# Patient Record
Sex: FEMALE | Race: OTHER | HISPANIC OR LATINO | URBAN - METROPOLITAN AREA
[De-identification: names, ages, dates, MRNs, and addresses within clinical notes are randomized per-mention and may not be internally consistent; named-entity substitution may affect disease eponyms.]

---

## 2019-06-01 ENCOUNTER — INPATIENT (INPATIENT)
Facility: HOSPITAL | Age: 69
LOS: 0 days | Discharge: HOME | DRG: 310 | End: 2019-06-02
Attending: INTERNAL MEDICINE | Admitting: INTERNAL MEDICINE
Payer: COMMERCIAL

## 2019-06-01 VITALS
SYSTOLIC BLOOD PRESSURE: 139 MMHG | DIASTOLIC BLOOD PRESSURE: 97 MMHG | HEART RATE: 141 BPM | WEIGHT: 138.89 LBS | TEMPERATURE: 98 F | RESPIRATION RATE: 18 BRPM | OXYGEN SATURATION: 97 %

## 2019-06-01 DIAGNOSIS — I48.91 UNSPECIFIED ATRIAL FIBRILLATION: ICD-10-CM

## 2019-06-01 LAB
ALBUMIN SERPL ELPH-MCNC: 4.4 G/DL — SIGNIFICANT CHANGE UP (ref 3.3–5)
ALP SERPL-CCNC: 87 U/L — SIGNIFICANT CHANGE UP (ref 40–120)
ALT FLD-CCNC: 16 U/L — SIGNIFICANT CHANGE UP (ref 10–45)
ANION GAP SERPL CALC-SCNC: 11 MMOL/L — SIGNIFICANT CHANGE UP (ref 5–17)
APTT BLD: 30.2 SEC — SIGNIFICANT CHANGE UP (ref 27.5–36.3)
AST SERPL-CCNC: 20 U/L — SIGNIFICANT CHANGE UP (ref 10–40)
BASOPHILS # BLD AUTO: 0.04 K/UL — SIGNIFICANT CHANGE UP (ref 0–0.2)
BASOPHILS NFR BLD AUTO: 0.4 % — SIGNIFICANT CHANGE UP (ref 0–2)
BILIRUB SERPL-MCNC: <0.2 MG/DL — SIGNIFICANT CHANGE UP (ref 0.2–1.2)
BUN SERPL-MCNC: 21 MG/DL — SIGNIFICANT CHANGE UP (ref 7–23)
CALCIUM SERPL-MCNC: 10.1 MG/DL — SIGNIFICANT CHANGE UP (ref 8.4–10.5)
CHLORIDE SERPL-SCNC: 109 MMOL/L — HIGH (ref 96–108)
CHOLEST SERPL-MCNC: 280 MG/DL — HIGH (ref 10–199)
CK MB CFR SERPL CALC: 1.8 NG/ML — SIGNIFICANT CHANGE UP (ref 0–6.7)
CK MB CFR SERPL CALC: 2.4 NG/ML — SIGNIFICANT CHANGE UP (ref 0–6.7)
CK MB CFR SERPL CALC: 2.5 NG/ML — SIGNIFICANT CHANGE UP (ref 0–6.7)
CK SERPL-CCNC: 70 U/L — SIGNIFICANT CHANGE UP (ref 25–170)
CK SERPL-CCNC: 70 U/L — SIGNIFICANT CHANGE UP (ref 25–170)
CK SERPL-CCNC: 93 U/L — SIGNIFICANT CHANGE UP (ref 25–170)
CO2 SERPL-SCNC: 26 MMOL/L — SIGNIFICANT CHANGE UP (ref 22–31)
CREAT SERPL-MCNC: 0.76 MG/DL — SIGNIFICANT CHANGE UP (ref 0.5–1.3)
EOSINOPHIL # BLD AUTO: 0.11 K/UL — SIGNIFICANT CHANGE UP (ref 0–0.5)
EOSINOPHIL NFR BLD AUTO: 1.2 % — SIGNIFICANT CHANGE UP (ref 0–6)
GLUCOSE SERPL-MCNC: 111 MG/DL — HIGH (ref 70–99)
HBA1C BLD-MCNC: 5.3 % — SIGNIFICANT CHANGE UP (ref 4–5.6)
HCT VFR BLD CALC: 44.1 % — SIGNIFICANT CHANGE UP (ref 34.5–45)
HDLC SERPL-MCNC: 44 MG/DL — LOW
HGB BLD-MCNC: 14.1 G/DL — SIGNIFICANT CHANGE UP (ref 11.5–15.5)
IMM GRANULOCYTES NFR BLD AUTO: 0.4 % — SIGNIFICANT CHANGE UP (ref 0–1.5)
INR BLD: 0.99 — SIGNIFICANT CHANGE UP (ref 0.88–1.16)
LIPID PNL WITH DIRECT LDL SERPL: 198 MG/DL — HIGH
LYMPHOCYTES # BLD AUTO: 2.62 K/UL — SIGNIFICANT CHANGE UP (ref 1–3.3)
LYMPHOCYTES # BLD AUTO: 27.6 % — SIGNIFICANT CHANGE UP (ref 13–44)
MCHC RBC-ENTMCNC: 29.7 PG — SIGNIFICANT CHANGE UP (ref 27–34)
MCHC RBC-ENTMCNC: 32 GM/DL — SIGNIFICANT CHANGE UP (ref 32–36)
MCV RBC AUTO: 93 FL — SIGNIFICANT CHANGE UP (ref 80–100)
MONOCYTES # BLD AUTO: 0.72 K/UL — SIGNIFICANT CHANGE UP (ref 0–0.9)
MONOCYTES NFR BLD AUTO: 7.6 % — SIGNIFICANT CHANGE UP (ref 2–14)
NEUTROPHILS # BLD AUTO: 5.97 K/UL — SIGNIFICANT CHANGE UP (ref 1.8–7.4)
NEUTROPHILS NFR BLD AUTO: 62.8 % — SIGNIFICANT CHANGE UP (ref 43–77)
NRBC # BLD: 0 /100 WBCS — SIGNIFICANT CHANGE UP (ref 0–0)
PLATELET # BLD AUTO: 289 K/UL — SIGNIFICANT CHANGE UP (ref 150–400)
POTASSIUM SERPL-MCNC: 4.4 MMOL/L — SIGNIFICANT CHANGE UP (ref 3.5–5.3)
POTASSIUM SERPL-SCNC: 4.4 MMOL/L — SIGNIFICANT CHANGE UP (ref 3.5–5.3)
PROT SERPL-MCNC: 7.6 G/DL — SIGNIFICANT CHANGE UP (ref 6–8.3)
PROTHROM AB SERPL-ACNC: 11.2 SEC — SIGNIFICANT CHANGE UP (ref 10–12.9)
RBC # BLD: 4.74 M/UL — SIGNIFICANT CHANGE UP (ref 3.8–5.2)
RBC # FLD: 13 % — SIGNIFICANT CHANGE UP (ref 10.3–14.5)
SODIUM SERPL-SCNC: 146 MMOL/L — HIGH (ref 135–145)
T3FREE SERPL-MCNC: 2.86 PG/ML — SIGNIFICANT CHANGE UP (ref 1.8–4.6)
T4 FREE SERPL-MCNC: 0.95 NG/DL — SIGNIFICANT CHANGE UP (ref 0.7–1.48)
TOTAL CHOLESTEROL/HDL RATIO MEASUREMENT: 6.4 RATIO — SIGNIFICANT CHANGE UP (ref 3.3–7.1)
TRIGL SERPL-MCNC: 191 MG/DL — HIGH (ref 10–149)
TROPONIN T SERPL-MCNC: <0.01 NG/ML — SIGNIFICANT CHANGE UP (ref 0–0.01)
TSH SERPL-MCNC: 1.41 UIU/ML — SIGNIFICANT CHANGE UP (ref 0.35–4.94)
WBC # BLD: 9.5 K/UL — SIGNIFICANT CHANGE UP (ref 3.8–10.5)
WBC # FLD AUTO: 9.5 K/UL — SIGNIFICANT CHANGE UP (ref 3.8–10.5)

## 2019-06-01 PROCEDURE — 99285 EMERGENCY DEPT VISIT HI MDM: CPT | Mod: 25

## 2019-06-01 PROCEDURE — 93306 TTE W/DOPPLER COMPLETE: CPT | Mod: 26

## 2019-06-01 PROCEDURE — 93010 ELECTROCARDIOGRAM REPORT: CPT

## 2019-06-01 PROCEDURE — 71045 X-RAY EXAM CHEST 1 VIEW: CPT | Mod: 26

## 2019-06-01 PROCEDURE — 99222 1ST HOSP IP/OBS MODERATE 55: CPT

## 2019-06-01 RX ORDER — ACETAMINOPHEN 500 MG
650 TABLET ORAL ONCE
Refills: 0 | Status: COMPLETED | OUTPATIENT
Start: 2019-06-01 | End: 2019-06-01

## 2019-06-01 RX ORDER — APIXABAN 2.5 MG/1
5 TABLET, FILM COATED ORAL ONCE
Refills: 0 | Status: COMPLETED | OUTPATIENT
Start: 2019-06-01 | End: 2019-06-01

## 2019-06-01 RX ORDER — DILTIAZEM HCL 120 MG
60 CAPSULE, EXT RELEASE 24 HR ORAL ONCE
Refills: 0 | Status: COMPLETED | OUTPATIENT
Start: 2019-06-01 | End: 2019-06-01

## 2019-06-01 RX ORDER — DILTIAZEM HCL 120 MG
10 CAPSULE, EXT RELEASE 24 HR ORAL ONCE
Refills: 0 | Status: COMPLETED | OUTPATIENT
Start: 2019-06-01 | End: 2019-06-01

## 2019-06-01 RX ORDER — APIXABAN 2.5 MG/1
5 TABLET, FILM COATED ORAL EVERY 12 HOURS
Refills: 0 | Status: DISCONTINUED | OUTPATIENT
Start: 2019-06-01 | End: 2019-06-02

## 2019-06-01 RX ORDER — SODIUM CHLORIDE 9 MG/ML
1000 INJECTION INTRAMUSCULAR; INTRAVENOUS; SUBCUTANEOUS ONCE
Refills: 0 | Status: COMPLETED | OUTPATIENT
Start: 2019-06-01 | End: 2019-06-01

## 2019-06-01 RX ORDER — METOPROLOL TARTRATE 50 MG
25 TABLET ORAL
Refills: 0 | Status: DISCONTINUED | OUTPATIENT
Start: 2019-06-01 | End: 2019-06-02

## 2019-06-01 RX ADMIN — Medication 60 MILLIGRAM(S): at 04:34

## 2019-06-01 RX ADMIN — APIXABAN 5 MILLIGRAM(S): 2.5 TABLET, FILM COATED ORAL at 22:44

## 2019-06-01 RX ADMIN — Medication 25 MILLIGRAM(S): at 10:01

## 2019-06-01 RX ADMIN — APIXABAN 5 MILLIGRAM(S): 2.5 TABLET, FILM COATED ORAL at 08:35

## 2019-06-01 RX ADMIN — Medication 25 MILLIGRAM(S): at 22:44

## 2019-06-01 RX ADMIN — Medication 650 MILLIGRAM(S): at 17:30

## 2019-06-01 RX ADMIN — Medication 10 MILLIGRAM(S): at 04:20

## 2019-06-01 RX ADMIN — Medication 650 MILLIGRAM(S): at 16:58

## 2019-06-01 RX ADMIN — SODIUM CHLORIDE 1000 MILLILITER(S): 9 INJECTION INTRAMUSCULAR; INTRAVENOUS; SUBCUTANEOUS at 04:20

## 2019-06-01 RX ADMIN — SODIUM CHLORIDE 1000 MILLILITER(S): 9 INJECTION INTRAMUSCULAR; INTRAVENOUS; SUBCUTANEOUS at 05:00

## 2019-06-01 NOTE — H&P ADULT - PROBLEM SELECTOR PLAN 1
currently rate controlled, will start Metoprolol Tartrate 25mg PO BID and continue Eliquis 5mg PO q 12 hours for anticoagulation.  --obtain Echocardiogram to assess LV function. currently asymptomatic and rate controlled. Will start Metoprolol Tartrate 25mg PO BID and continue Eliquis 5mg PO q 12 hours for anticoagulation.  --obtain Echocardiogram to assess LV function.

## 2019-06-01 NOTE — ED PROVIDER NOTE - OBJECTIVE STATEMENT
for about the past 10 hours has not been feeling well,  felt like heart beating fast, gen weak  no chest pain  hx of episode of afib once in past while in hospital with sepsis

## 2019-06-01 NOTE — ED ADULT NURSE NOTE - CHPI ED NUR SYMPTOMS NEG
no nausea/no syncope/no chest pain/no chills/no diaphoresis/no vomiting/no congestion/no back pain/no dizziness/no shortness of breath

## 2019-06-01 NOTE — H&P ADULT - ASSESSMENT
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 ED 6/1/19 c/o palpitations and generalized fatigue x several hours, EKG revealed Afib with RVR, cardiac enzymes negative x 1 set, patient treated with Cardizem IV/PO with improvement in HR, now admitted to [facility] for cardiac telemetry and further management of Afib. 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 for cardiac telemetry and further management of Afib.

## 2019-06-01 NOTE — ED ADULT TRIAGE NOTE - CHIEF COMPLAINT QUOTE
pt brought in by daughter for eval of palpitations generalized fatigue not feeling well today denies CP SOB N/V pt visiting from [facility] hx arrythmia on ASA and betablocker

## 2019-06-01 NOTE — ED ADULT NURSE NOTE - OBJECTIVE STATEMENT
Received a 69 year old [facility] female with a chief complaint of palpitations with spontaneous onset this evening. Patient is currently visiting daughter in [facility] with spouse. Patient placed on continuous cardiac monitoring with a trend of AFIB with -152/min Patient's spouse is a cardiologist and reports that the last time the patient had such a presentation, he reports that the patient was septic. Patient is currently under an ASA regimen and a low dose beta-blocker for management.

## 2019-06-01 NOTE — H&P ADULT - HISTORY OF PRESENT ILLNESS
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 ED 6/1/19 c/o palpitations x several hours. Patient states for the past 10 hours she has been feeling like her heart is pounding out of her chest. Associated symptoms including generalized fatigue.   Patient denies any N/V, diaphoresis, dizziness, SOB, chest pain, recent PND, LE edema or sick contacts.   In ED, BP: 139/97, HR:141, RR:18, Temp: 97.8F, O2 sat: 97% RA. EKG revealed Afib@137BPM without acute ST/T wave changes. Cardiac enzymes negative x 1 set. CXR unremarkable. Patient treated with Cardizem 10mg IV x 1 dose, Cardizem 60mg PO x 1 dose and Eliquis 5mg PO x 1 dose.    Patient currently stable, admitted to [facility] for cardiac telemetry and further medical management of Afib. 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 ED 6/1/19 c/o generalized fatigue x several hours. Patient states she came to visit her daughter 10 days ago and [facility]. Patient states typically can walk without limitations, however since yesterday evening she has had no energy. Patients  who is a cardiologist checked her pulse noted it to be irregular and rapid, prompting them to come to the ER. Patient denies any N/V, diaphoresis, dizziness, SOB, chest pain, recent PND, LE edema or sick contacts.   In ED, BP: 139/97, HR:141, RR:18, Temp: 97.8F, O2 sat: 97% RA. EKG revealed Afib@137BPM without acute ST/T wave changes. Cardiac enzymes negative x 1 set. CXR unremarkable. Patient treated with Cardizem 10mg IV x 1 dose, Cardizem 60mg PO x 1 dose and Eliquis 5mg PO x 1 dose.  Patient currently stable, admitted to [facility] for cardiac telemetry and further medical management of Afib.

## 2019-06-01 NOTE — ED PROVIDER NOTE - CLINICAL SUMMARY MEDICAL DECISION MAKING FREE TEXT BOX
68 yo female with hx of one episode of afib in past, now with episode today with RVR, rate in 140's.  will give cardizem to slow rate, check labs.  will need admission for rate control/work up

## 2019-06-01 NOTE — ED ADULT NURSE NOTE - CHIEF COMPLAINT QUOTE
pt brought in by daughter for eval of palpitations generalized fatigue not feeling well today denies CP SOB N/V pt visiting from [facility] hx arrythmia on ASA and betablocker

## 2019-06-02 VITALS
RESPIRATION RATE: 18 BRPM | SYSTOLIC BLOOD PRESSURE: 134 MMHG | OXYGEN SATURATION: 95 % | DIASTOLIC BLOOD PRESSURE: 61 MMHG | HEART RATE: 81 BPM

## 2019-06-02 LAB
ANION GAP SERPL CALC-SCNC: 11 MMOL/L — SIGNIFICANT CHANGE UP (ref 5–17)
BUN SERPL-MCNC: 15 MG/DL — SIGNIFICANT CHANGE UP (ref 7–23)
CALCIUM SERPL-MCNC: 9.1 MG/DL — SIGNIFICANT CHANGE UP (ref 8.4–10.5)
CHLORIDE SERPL-SCNC: 107 MMOL/L — SIGNIFICANT CHANGE UP (ref 96–108)
CO2 SERPL-SCNC: 24 MMOL/L — SIGNIFICANT CHANGE UP (ref 22–31)
CREAT SERPL-MCNC: 0.64 MG/DL — SIGNIFICANT CHANGE UP (ref 0.5–1.3)
GLUCOSE SERPL-MCNC: 102 MG/DL — HIGH (ref 70–99)
HCT VFR BLD CALC: 43.5 % — SIGNIFICANT CHANGE UP (ref 34.5–45)
HCV AB S/CO SERPL IA: 0.04 S/CO — SIGNIFICANT CHANGE UP
HCV AB SERPL-IMP: SIGNIFICANT CHANGE UP
HGB BLD-MCNC: 13.6 G/DL — SIGNIFICANT CHANGE UP (ref 11.5–15.5)
MAGNESIUM SERPL-MCNC: 2.1 MG/DL — SIGNIFICANT CHANGE UP (ref 1.6–2.6)
MCHC RBC-ENTMCNC: 29.8 PG — SIGNIFICANT CHANGE UP (ref 27–34)
MCHC RBC-ENTMCNC: 31.3 GM/DL — LOW (ref 32–36)
MCV RBC AUTO: 95.4 FL — SIGNIFICANT CHANGE UP (ref 80–100)
NRBC # BLD: 0 /100 WBCS — SIGNIFICANT CHANGE UP (ref 0–0)
PLATELET # BLD AUTO: 223 K/UL — SIGNIFICANT CHANGE UP (ref 150–400)
POTASSIUM SERPL-MCNC: 4 MMOL/L — SIGNIFICANT CHANGE UP (ref 3.5–5.3)
POTASSIUM SERPL-SCNC: 4 MMOL/L — SIGNIFICANT CHANGE UP (ref 3.5–5.3)
RBC # BLD: 4.56 M/UL — SIGNIFICANT CHANGE UP (ref 3.8–5.2)
RBC # FLD: 13.1 % — SIGNIFICANT CHANGE UP (ref 10.3–14.5)
SODIUM SERPL-SCNC: 142 MMOL/L — SIGNIFICANT CHANGE UP (ref 135–145)
WBC # BLD: 7.05 K/UL — SIGNIFICANT CHANGE UP (ref 3.8–10.5)
WBC # FLD AUTO: 7.05 K/UL — SIGNIFICANT CHANGE UP (ref 3.8–10.5)

## 2019-06-02 PROCEDURE — 83036 HEMOGLOBIN GLYCOSYLATED A1C: CPT

## 2019-06-02 PROCEDURE — 84443 ASSAY THYROID STIM HORMONE: CPT

## 2019-06-02 PROCEDURE — 85025 COMPLETE CBC W/AUTO DIFF WBC: CPT

## 2019-06-02 PROCEDURE — 82550 ASSAY OF CK (CPK): CPT

## 2019-06-02 PROCEDURE — 85610 PROTHROMBIN TIME: CPT

## 2019-06-02 PROCEDURE — 96374 THER/PROPH/DIAG INJ IV PUSH: CPT

## 2019-06-02 PROCEDURE — 84484 ASSAY OF TROPONIN QUANT: CPT

## 2019-06-02 PROCEDURE — 36415 COLL VENOUS BLD VENIPUNCTURE: CPT

## 2019-06-02 PROCEDURE — 99238 HOSP IP/OBS DSCHRG MGMT 30/<: CPT

## 2019-06-02 PROCEDURE — 80061 LIPID PANEL: CPT

## 2019-06-02 PROCEDURE — 93306 TTE W/DOPPLER COMPLETE: CPT

## 2019-06-02 PROCEDURE — 84439 ASSAY OF FREE THYROXINE: CPT

## 2019-06-02 PROCEDURE — 83735 ASSAY OF MAGNESIUM: CPT

## 2019-06-02 PROCEDURE — 80048 BASIC METABOLIC PNL TOTAL CA: CPT

## 2019-06-02 PROCEDURE — 86803 HEPATITIS C AB TEST: CPT

## 2019-06-02 PROCEDURE — 80053 COMPREHEN METABOLIC PANEL: CPT

## 2019-06-02 PROCEDURE — 82553 CREATINE MB FRACTION: CPT

## 2019-06-02 PROCEDURE — 93005 ELECTROCARDIOGRAM TRACING: CPT

## 2019-06-02 PROCEDURE — 85730 THROMBOPLASTIN TIME PARTIAL: CPT

## 2019-06-02 PROCEDURE — 84481 FREE ASSAY (FT-3): CPT

## 2019-06-02 PROCEDURE — 93010 ELECTROCARDIOGRAM REPORT: CPT

## 2019-06-02 PROCEDURE — 85027 COMPLETE CBC AUTOMATED: CPT

## 2019-06-02 PROCEDURE — 99285 EMERGENCY DEPT VISIT HI MDM: CPT | Mod: 25

## 2019-06-02 PROCEDURE — 96361 HYDRATE IV INFUSION ADD-ON: CPT

## 2019-06-02 PROCEDURE — 71045 X-RAY EXAM CHEST 1 VIEW: CPT

## 2019-06-02 RX ORDER — APIXABAN 2.5 MG/1
1 TABLET, FILM COATED ORAL
Qty: 60 | Refills: 3
Start: 2019-06-02 | End: 2019-09-29

## 2019-06-02 RX ORDER — ATORVASTATIN CALCIUM 80 MG/1
1 TABLET, FILM COATED ORAL
Qty: 30 | Refills: 3
Start: 2019-06-02 | End: 2019-09-29

## 2019-06-02 RX ORDER — METOPROLOL TARTRATE 50 MG
1 TABLET ORAL
Qty: 60 | Refills: 3
Start: 2019-06-02 | End: 2019-09-29

## 2019-06-02 RX ORDER — METOPROLOL TARTRATE 50 MG
1 TABLET ORAL
Qty: 30 | Refills: 3
Start: 2019-06-02 | End: 2019-09-29

## 2019-06-02 RX ORDER — ACETAMINOPHEN 500 MG
325 TABLET ORAL ONCE
Refills: 0 | Status: COMPLETED | OUTPATIENT
Start: 2019-06-02 | End: 2019-06-02

## 2019-06-02 RX ORDER — BISOPROLOL FUMARATE 10 MG/1
1 TABLET, FILM COATED ORAL
Qty: 0 | Refills: 0 | DISCHARGE

## 2019-06-02 RX ORDER — ASPIRIN/CALCIUM CARB/MAGNESIUM 324 MG
1 TABLET ORAL
Qty: 0 | Refills: 0 | DISCHARGE

## 2019-06-02 RX ADMIN — Medication 325 MILLIGRAM(S): at 11:41

## 2019-06-02 RX ADMIN — Medication 25 MILLIGRAM(S): at 09:18

## 2019-06-02 RX ADMIN — APIXABAN 5 MILLIGRAM(S): 2.5 TABLET, FILM COATED ORAL at 09:18

## 2019-06-02 NOTE — DISCHARGE NOTE PROVIDER - NSDCCPCAREPLAN_GEN_ALL_CORE_FT
PRINCIPAL DISCHARGE DIAGNOSIS  Diagnosis: HTN (hypertension)  Assessment and Plan of Treatment:       SECONDARY DISCHARGE DIAGNOSES  Diagnosis: HLD (hyperlipidemia)  Assessment and Plan of Treatment:     Diagnosis: HTN (hypertension)  Assessment and Plan of Treatment: PRINCIPAL DISCHARGE DIAGNOSIS  Diagnosis: Atrial fibrillation  Assessment and Plan of Treatment: You have a diagnosis of Atrial Fibrillation. Atrial Fibrillation is a quivering or irregular heartbeat (arrhythmia) that can lead to blood clots, stroke, heart failure and other heart-related complications, so proper management is important. Please continue taking your Eliquis 5mg twice daily and follow up with your Cardiologist.      SECONDARY DISCHARGE DIAGNOSES  Diagnosis: HLD (hyperlipidemia)  Assessment and Plan of Treatment: You have a diagnosis of high Cholesterol. Please start taking Atorvatatin 40mg every night to keep your Cholesterol low. High Cholesterol contributes to Heart Disease.    Diagnosis: HTN (hypertension)  Assessment and Plan of Treatment: You have a history of Hypertension or elevated blood pressure. Please continue taking your medications as listed to keep your blood pressure controlled. In addition, there are multiple lifestyle modifications that have been proven to lower blood pressure: maintaining a healthy body weight, engaging in regular physical activity for at least 30 minutes per day on most days, and consuming a diet rich in fruits, vegetables, and low-fat dairy products with a reduced amount of total and saturated fats and sodium.  For blood pressures at home that are too high or low please see your doctor or go to the Emergency Room as necessary. PRINCIPAL DISCHARGE DIAGNOSIS  Diagnosis: Atrial fibrillation  Assessment and Plan of Treatment: You have a diagnosis of Paroxysmal (off and on) Atrial Fibrillation. Atrial Fibrillation is a quivering or irregular heartbeat (arrhythmia) that can lead to blood clots, stroke, heart failure and other heart-related complications, so proper management is important. When you came into the hospital your heart rate was very fast and irregular. This morning your heart went back to regular rhythm. Please continue taking your Eliquis 5mg twice daily and follow up with your Cardiologist.      SECONDARY DISCHARGE DIAGNOSES  Diagnosis: HLD (hyperlipidemia)  Assessment and Plan of Treatment: You have a diagnosis of high Cholesterol. Please start taking Atorvatatin 40mg every night to keep your Cholesterol low. High Cholesterol contributes to Heart Disease.    Diagnosis: HTN (hypertension)  Assessment and Plan of Treatment: You have a history of Hypertension or elevated blood pressure. Please continue taking your medications as listed to keep your blood pressure controlled. In addition, there are multiple lifestyle modifications that have been proven to lower blood pressure: maintaining a healthy body weight, engaging in regular physical activity for at least 30 minutes per day on most days, and consuming a diet rich in fruits, vegetables, and low-fat dairy products with a reduced amount of total and saturated fats and sodium.  For blood pressures at home that are too high or low please see your doctor or go to the Emergency Room as necessary.

## 2019-06-02 NOTE — DISCHARGE NOTE PROVIDER - HOSPITAL COURSE
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 Patient states she came to visit her daughter 10 days ago and was walking around [facility] yesterday. Patient states typically can walk without limitations, however since yesterday evening she has had no energy. Patients  who is a cardiologist checked her pulse noted it to be irregular and rapid, prompting them to come to the ER. Patient denies any N/V, diaphoresis, dizziness, SOB, chest pain, recent PND, LE edema or sick contacts. In ED, BP: 139/97, HR:141, RR:18, Temp: 97.8F, O2 sat: 97% RA. EKG revealed Afib@137BPM without acute ST/T wave changes. Cardiac enzymes negative x 1. CXR unremarkable. Patient received with Cardizem 10mg IV x 1 dose, Cardizem 60mg PO x 1 dose and Eliquis 5mg PO x 1 dose. Patient currently stable, admitted to [facility] for cardiac telemetry and further medical management of Afib. Trops neg x3. Patient noted to have converted to NSR around 5 am, confirmed by 12 lead EKG.         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 Patient states she came to visit her daughter 10 days ago and was walking around [facility] yesterday. Patient states typically can walk without limitations, however since yesterday evening she has had no energy. Patients  who is a cardiologist checked her pulse noted it to be irregular and rapid, prompting them to come to the ER. Patient denies any N/V, diaphoresis, dizziness, SOB, chest pain, recent PND, LE edema or sick contacts. In ED, BP: 139/97, HR:141, RR:18, Temp: 97.8F, O2 sat: 97% RA. EKG revealed Afib@137BPM without acute ST/T wave changes. Cardiac enzymes negative x 1. CXR unremarkable. Patient received with Cardizem 10mg IV x 1 dose, Cardizem 60mg PO x 1 dose and Eliquis 5mg PO x 1 dose. Patient currently stable, admitted to [facility] for cardiac telemetry and further medical management of Afib. Trops neg x3. TSH, T3, and T4 wnl. Patient noted to have converted to NSR around 5 am, confirmed by 12 lead EKG. Patient to be discharged on Eliquis 5mg BID and Atorvastatin 40mg QHS for hyperlipidemia. Patient is asymptomatic denying chest pain, palpitations, shortness of breath, nausea, and vomiting and without issues voiding. Vitals remained stable overnight, Telemetry and morning labs were reviewed. Patient deemed stable for discharge today per Dr. Streeter. Patient has been given discharge instructions including medication regimen and follow up. Patient's medications have been electronically prescribed to their preferred Pharmacy. 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 Patient states she came to visit her daughter 10 days ago and was walking around [facility] yesterday. Patient states typically can walk without limitations, however since yesterday evening she has had no energy. Patients  who is a cardiologist checked her pulse noted it to be irregular and rapid, prompting them to come to the ER. Patient denies any N/V, diaphoresis, dizziness, SOB, chest pain, recent PND, LE edema or sick contacts. In ED, BP: 139/97, HR:141, RR:18, Temp: 97.8F, O2 sat: 97% RA. EKG revealed Afib@137BPM without acute ST/T wave changes. Cardiac enzymes negative x 1. CXR unremarkable. Patient received with Cardizem 10mg IV x 1 dose, Cardizem 60mg PO x 1 dose and Eliquis 5mg PO x 1 dose. Patient currently stable, admitted to [facility] for cardiac telemetry and further medical management of Afib. Trops neg x3. TSH, T3, and T4 wnl. Patient noted to have converted to NSR around 5 am, confirmed by 12 lead EKG. Patient to be discharged on Eliquis 5mg BID and Atorvastatin 40mg QHS for hyperlipidemia. Patient is asymptomatic denying chest pain, palpitations, shortness of breath, nausea, and vomiting and without issues voiding. Vitals remained stable overnight, Telemetry and morning labs were reviewed. Patient deemed stable for discharge today per Dr. Streeter. Patient has been given discharge instructions including medication regimen and follow up. Patient's medications have been electronically prescribed to her preferred Pharmacy.

## 2019-06-02 NOTE — DISCHARGE NOTE NURSING/CASE MANAGEMENT/SOCIAL WORK - NSDCDPATPORTLINK_GEN_ALL_CORE
You can access the [facility] Patient Portal, offered by [facility], by registering with the following website: [email or web address]

## 2019-06-06 DIAGNOSIS — I10 ESSENTIAL (PRIMARY) HYPERTENSION: ICD-10-CM

## 2019-06-06 DIAGNOSIS — I48.0 PAROXYSMAL ATRIAL FIBRILLATION: ICD-10-CM

## 2019-06-06 DIAGNOSIS — E78.5 HYPERLIPIDEMIA, UNSPECIFIED: ICD-10-CM

## 2019-09-23 NOTE — ED PROVIDER NOTE - CARDIOVASCULAR [+], MLM
"Initial /79 (BP Location: Left arm, Patient Position: Sitting, Cuff Size: Adult Large)   Pulse 69   LMP 10/19/2012   SpO2 97%  Estimated body mass index is 34.06 kg/m  as calculated from the following:    Height as of 12/26/18: 1.727 m (5' 8\").    Weight as of 12/26/18: 101.6 kg (224 lb). .      "
PALPITATIONS

## 2020-02-12 NOTE — ED ADULT NURSE NOTE - NS ED NURSE RECORD ANOTHER VITAL SIGN
Principal Discharge DX:	Leg edema  Secondary Diagnosis:	Renal failure  Secondary Diagnosis:	Hyperglycemia Yes

## 2022-04-19 NOTE — PATIENT PROFILE ADULT - NSTRANSFERBELONGINGSDISPO_GEN_A_NUR
Occupational Therapy  Patient not seen in therapy.     Discontinue therapy: physician request and family request    Discontinue OT orders received. Patient transitioning to hospice care. OT to sign off on current orders. Please re-consult if new needs arise.       OBJECTIVE                   Documented in the chart in the following areas: Assessment. Plan.      Therapy procedure time and total treatment time can be found documented on the Time Entry flowsheet   with patient

## 2023-01-11 NOTE — ED PROVIDER NOTE - CPE EDP CARDIAC NORM
CALLED TO GET FAX NUMBER AND HAD TO LEAVE VOICEMAIL. WHEN I SPOKE WITH AMBER SHE STATED THEY WOULD SEE IT ON [employer] BUT LEFT A VM FOR HER TO CALL ME BACK TO CONFIRM THEY RECEIVED IT.    - - -

## 2024-05-21 NOTE — ED PROVIDER NOTE - INPATIENT RESIDENT/ACP NOTIFIED DATE
CHW - Initial Contact    This Community Health Worker completed the Social Determinant of Health questionnaire with patient during clinic visit today.    Pt identified barriers of most importance are:  patient identified barriers during clinic visit   Referrals to community agencies completed with patient consent outside of [facility] include: No community referral needed during clinic visit   Referrals were put through [facility] - no:   Support and Services: No support services needed during clinic visit   Other information discussed the patient needs help with: patient discussed no other information  during clinic visit   Follow up required: yes  No future outreach task assigned      01-Jun-2019 06:10